# Patient Record
Sex: FEMALE | Race: WHITE | URBAN - METROPOLITAN AREA
[De-identification: names, ages, dates, MRNs, and addresses within clinical notes are randomized per-mention and may not be internally consistent; named-entity substitution may affect disease eponyms.]

---

## 2024-07-01 ENCOUNTER — HOSPITAL ENCOUNTER (OUTPATIENT)
Age: 74
Discharge: HOME | End: 2024-07-01
Payer: MEDICARE

## 2024-07-01 VITALS
OXYGEN SATURATION: 100 % | TEMPERATURE: 97 F | DIASTOLIC BLOOD PRESSURE: 77 MMHG | SYSTOLIC BLOOD PRESSURE: 145 MMHG | RESPIRATION RATE: 20 BRPM | HEART RATE: 87 BPM

## 2024-07-01 DIAGNOSIS — N39.0 ACUTE URINARY TRACT INFECTION: Primary | ICD-10-CM

## 2024-07-01 LAB
#MXD IC: 0.5 X10ˆ3/UL (ref 0.1–1)
BILIRUB UR QL STRIP: NEGATIVE
BUN BLD-MCNC: 15 MG/DL (ref 7–18)
CHLORIDE BLD-SCNC: 99 MMOL/L (ref 98–112)
CO2 BLD-SCNC: 24 MMOL/L (ref 21–32)
COLOR UR: YELLOW
CREAT BLD-MCNC: 0.5 MG/DL
EGFRCR SERPLBLD CKD-EPI 2021: 98 ML/MIN/1.73M2 (ref 60–?)
GLUCOSE BLD-MCNC: 130 MG/DL (ref 70–99)
GLUCOSE UR STRIP-MCNC: NEGATIVE MG/DL
HCT VFR BLD AUTO: 36 %
HCT VFR BLD CALC: 39 %
HGB BLD-MCNC: 12.2 G/DL
ISTAT IONIZED CALCIUM FOR CHEM 8: 1.18 MMOL/L (ref 1.12–1.32)
KETONES UR STRIP-MCNC: NEGATIVE MG/DL
LYMPHOCYTES # BLD AUTO: 2 X10ˆ3/UL (ref 1–4)
LYMPHOCYTES NFR BLD AUTO: 19.7 %
MCH RBC QN AUTO: 29.8 PG (ref 26–34)
MCHC RBC AUTO-ENTMCNC: 33.9 G/DL (ref 31–37)
MCV RBC AUTO: 88 FL (ref 80–100)
MIXED CELL %: 5.3 %
NEUTROPHILS # BLD AUTO: 7.7 X10ˆ3/UL (ref 1.5–7.7)
NEUTROPHILS NFR BLD AUTO: 75 %
NITRITE UR QL STRIP: POSITIVE
PH UR STRIP: 5.5 [PH]
PLATELET # BLD AUTO: 280 X10ˆ3/UL (ref 150–450)
POTASSIUM BLD-SCNC: 3.5 MMOL/L (ref 3.6–5.1)
PROT UR STRIP-MCNC: 30 MG/DL
RBC # BLD AUTO: 4.09 X10ˆ6/UL
SODIUM BLD-SCNC: 136 MMOL/L (ref 136–145)
SP GR UR STRIP: 1.01
UROBILINOGEN UR STRIP-ACNC: <2 MG/DL
WBC # BLD AUTO: 10.2 X10ˆ3/UL (ref 4–11)

## 2024-07-01 PROCEDURE — 80047 BASIC METABLC PNL IONIZED CA: CPT | Performed by: PHYSICIAN ASSISTANT

## 2024-07-01 PROCEDURE — 81002 URINALYSIS NONAUTO W/O SCOPE: CPT | Performed by: PHYSICIAN ASSISTANT

## 2024-07-01 PROCEDURE — 99204 OFFICE O/P NEW MOD 45 MIN: CPT | Performed by: PHYSICIAN ASSISTANT

## 2024-07-01 PROCEDURE — 85025 COMPLETE CBC W/AUTO DIFF WBC: CPT | Performed by: PHYSICIAN ASSISTANT

## 2024-07-01 RX ORDER — RALOXIFENE HYDROCHLORIDE 60 MG/1
1 TABLET, FILM COATED ORAL DAILY
COMMUNITY

## 2024-07-01 RX ORDER — HYDROCHLOROTHIAZIDE 25 MG/1
1 TABLET ORAL EVERY MORNING
COMMUNITY
Start: 2024-05-31

## 2024-07-01 RX ORDER — CIPROFLOXACIN 500 MG/1
500 TABLET, FILM COATED ORAL 2 TIMES DAILY
Qty: 14 TABLET | Refills: 0 | Status: SHIPPED | OUTPATIENT
Start: 2024-07-01 | End: 2024-07-08

## 2024-07-01 RX ORDER — CITALOPRAM HYDROBROMIDE 10 MG/1
1 TABLET ORAL DAILY
COMMUNITY

## 2024-07-01 RX ORDER — METFORMIN HYDROCHLORIDE 500 MG/1
1 TABLET, EXTENDED RELEASE ORAL DAILY
COMMUNITY
Start: 2024-05-09

## 2024-07-01 NOTE — ED INITIAL ASSESSMENT (HPI)
BURNING WITH URINATION FOR 3-4 DAYS.  PT LAST TOOK AZO YESTERDAY.  NO FEVER.  Pt is visiting from [place].  Pt is complaining of back pain.

## 2024-07-01 NOTE — DISCHARGE INSTRUCTIONS
Complete entire course of antibiotic as directed   Urine culture results in 1-2 days   Drink plenty of water and get plenty of rest   Alternate Tylenol/Motrin as needed for pain   Follow up with your primary care provider     If you experience severe or worsening pain, fevers, chills, vomiting, dizziness or weakness, or any other concerning symptoms, go to nearest ER immediately

## 2024-07-01 NOTE — ED PROVIDER NOTES
Chief Complaint   Patient presents with    Urinary Symptoms       History obtained from: patient, daughter at bedside   services declined    HPI:     Marilia Mancini is a 74 year old female who presents with urinary symptoms x 4 days. Patient endorses dysuria, urgency, sensation of incomplete voiding, and suprapubic pressure when urinating.  Patient endorses bilateral lower back pain x 2 days.  Patient took Azo at home without significant improvement.  Patient continues to eat and drink normally and is otherwise feeling well.  Patient currently in town from [place] visiting her daughter for the next week.  Denies fevers, chills, nausea, vomiting, hematuria, vaginal bleeding or discharge, chest pain, shortness of breath, dizziness, lightheadedness, weakness.    PMH  Past Medical History:    Anxiety    Diabetes (HCC)    Essential hypertension    Osteoporosis       PFSH    PFSH asessment screens reviewed and agree.  Nurses notes reviewed I agree with documentation.    Family History   Problem Relation Age of Onset    Other Father         s/p cva    Heart Disorder Mother      Family history reviewed with patient/caregiver and is not pertinent to presenting problem.  Social History     Socioeconomic History    Marital status:      Spouse name: Not on file    Number of children: Not on file    Years of education: Not on file    Highest education level: Not on file   Occupational History    Not on file   Tobacco Use    Smoking status: Never    Smokeless tobacco: Never   Substance and Sexual Activity    Alcohol use: No    Drug use: No    Sexual activity: Not on file   Other Topics Concern    Not on file   Social History Narrative    Not on file     Social Determinants of Health     Financial Resource Strain: Not on file   Food Insecurity: Not on file   Transportation Needs: Not on file   Physical Activity: Not on file   Stress: Not on file   Social Connections: Not on file   Housing Stability: Not on file          ROS:   Positive for stated complaint: Urinary symptoms, low back pain  All other systems reviewed and negative except as noted above.  Constitutional and Vital Signs Reviewed.    Physical Exam:     Findings:    /77   Pulse 87   Temp 97.2 °F (36.2 °C) (Temporal)   Resp 20   SpO2 100%   GENERAL: well developed, no acute distress, non-toxic appearing   SKIN: good skin turgor, no obvious rashes  HEAD: normocephalic, atraumatic  EYES: sclera non-icteric bilaterally, conjunctiva clear bilaterally  OROPHARYNX: MMM, pharynx clear, maintaining airway and secretions  NECK: no nuchal rigidity, no trismus, no edema, phonation normal    CARDIO: RRR, normal heart sounds   LUNGS: clear to auscultation bilaterally, no increased WOB, no rales, rhonchi, or wheezes  GI: normoactive bowel sounds, abdomen soft, minimal suprapubic tenderness, no rebound tenderness or guarding, bilateral CVA tenderness  EXTREMITIES: no cyanosis or edema, ELLIS without difficulty  NEURO: no focal deficits  PSYCH: alert and oriented x3, answering questions appropriately, mood appropriate    MDM/Assessment/Plan:   Orders for this encounter:    Orders Placed This Encounter    POCT CBC     Order Specific Question:   Release to patient     Answer:   Immediate    POCT Urinalysis Dipstick    POCT ISTAT chem8 cartridge    Urine Culture, Routine     Order Specific Question:   Specimen source:     Answer:   Urine, clean catch     Order Specific Question:   Documentation of symptoms of UTI or sepsis:     Answer:   Documentation of symptoms of UTI or sepsis must be corroborated within the EMR     Order Specific Question:   Release to patient     Answer:   Immediate    POCT Urine Dip    iStat (Chem 8)    ciprofloxacin 500 MG Oral Tab     Sig: Take 1 tablet (500 mg total) by mouth 2 (two) times daily for 7 days.     Dispense:  14 tablet     Refill:  0       Labs performed this visit:  Recent Results (from the past 10 hour(s))   POCT Urinalysis Dipstick     Collection Time: 07/01/24  5:56 PM   Result Value Ref Range    Urine Color Yellow Yellow    Urine Clarity Cloudy (A) Clear    Specific Gravity, Urine 1.015 1.005 - 1.030    PH, Urine 5.5 5.0 - 8.0    Protein urine 30 (A) Negative mg/dL    Glucose, Urine Negative Negative mg/dL    Ketone, Urine Negative Negative mg/dL    Bilirubin, Urine Negative Negative    Blood, Urine Moderate (A) Negative    Nitrite Urine Positive (A) Negative    Urobilinogen urine <2.0 <2.0 mg/dL    Leukocyte esterase urine Moderate (A) Negative   POCT CBC    Collection Time: 07/01/24  6:15 PM   Result Value Ref Range    WBC IC 10.2 4.0 - 11.0 x10ˆ3/uL    RBC IC 4.09 3.80 - 5.30 X10ˆ6/uL    HGB IC 12.2 12.0 - 16.0 g/dL    HCT IC 36.0 35.0 - 48.0 %    MCV IC 88.0 80.0 - 100.0 fL    MCH IC 29.8 26.0 - 34.0 pg    MCHC IC 33.9 31.0 - 37.0 g/dL    PLT .0 150.0 - 450.0 X10ˆ3/uL    # Neutrophil 7.7 1.5 - 7.7 X10ˆ3/uL    # Lymphocyte 2.0 1.0 - 4.0 X10ˆ3/uL    # Mixed Cells 0.5 0.1 - 1.0 X10ˆ3/uL    Neutrophil % 75.0 %    Lymphocyte % 19.7 %    Mixed Cell % 5.3 %   POCT ISTAT chem8 cartridge    Collection Time: 07/01/24  6:22 PM   Result Value Ref Range    ISTAT Sodium 136 136 - 145 mmol/L    ISTAT BUN 15 7 - 18 mg/dL    ISTAT Potassium 3.5 (L) 3.6 - 5.1 mmol/L    ISTAT Chloride 99 98 - 112 mmol/L    ISTAT Ionized Calcium 1.18 1.12 - 1.32 mmol/L    ISTAT Hematocrit 39 34 - 50 %    ISTAT Glucose 130 (H) 70 - 99 mg/dL    ISTAT TCO2 24 21 - 32 mmol/L    ISTAT Creatinine 0.50 (L) 0.55 - 1.02 mg/dL    eGFR-Cr 98 >=60 mL/min/1.73m2       Imaging performed this visit:  No orders to display       Medical Decision Making  DDx includes UTI versus pyelonephritis versus interstitial cystitis versus nephrolithiasis versus other. Patient does have bilateral flank pain in the setting of urinary symptoms however patient is very well-appearing with stable vitals, no fevers, and tolerating oral intake.  Urine dipstick analysis reveals cloudy urine with moderate  blood, positive nitrites, and moderate leukocyte esterase. Urine culture pending. These findings in the setting of patient's symptoms is concerning for an acute complicated UTI.  Basic labs ordered to evaluate for signs of infection, electrolyte derangements, or kidney dysfunction.  CBC reviewed, grossly unremarkable without evidence of infection or anemia.  BMP reviewed, grossly unremarkable without evidence of electrolyte derangements or kidney dysfunction.  Shared decision making employed with patient and patient's daughter to treat empirically for complicated UTI given flank pain in the setting of UTI versus further evaluation with CT imaging.  Patient and patient's daughter feel comfortable with empiric treatment at this time. Rx ciprofloxacin twice daily x 7 days. Discussed supportive care including rest, increased fluid intake, and OTC Tylenol/Motrin as needed for pain. Instructed patient to go directly to nearest ER with any worsening or concerning symptoms. Follow up with PCP.    Discussed case with supervising attending Dr. Gomez who is in agreement with assessment and plan.    Amount and/or Complexity of Data Reviewed  Labs: ordered.    Risk  OTC drugs.  Prescription drug management.          Diagnosis:    ICD-10-CM    1. Acute urinary tract infection  N39.0 Urine Culture, Routine     Urine Culture, Routine          All results reviewed and discussed with patient/patient's family. Patient/patient's family verbalize excellent understanding of instructions and feels comfortable with plan. All of patient's/patient's family's questions were addressed.   See AVS for detailed discharge instructions for your condition today.    Follow Up with:  Alejandro Ureña MD  [address] [place] [address]  [phone]    Schedule an appointment as soon as possible for a visit   New primary care provider      Note: This document was dictated using Dragon medical dictation software.  Proofreading was  performed to the best of my ability, but errors may be present.    Melia Waggoner PA-C